# Patient Record
Sex: MALE | Race: WHITE | Employment: UNEMPLOYED | URBAN - METROPOLITAN AREA
[De-identification: names, ages, dates, MRNs, and addresses within clinical notes are randomized per-mention and may not be internally consistent; named-entity substitution may affect disease eponyms.]

---

## 2020-02-05 ENCOUNTER — OFFICE VISIT (OUTPATIENT)
Dept: PODIATRY CLINIC | Facility: CLINIC | Age: 30
End: 2020-02-05
Payer: MEDICAID

## 2020-02-05 DIAGNOSIS — L60.0 INGROWN TOENAIL OF BOTH FEET: Primary | ICD-10-CM

## 2020-02-05 PROCEDURE — 99202 OFFICE O/P NEW SF 15 MIN: CPT | Performed by: PODIATRIST

## 2020-02-05 RX ORDER — OXYCODONE AND ACETAMINOPHEN 10; 325 MG/1; MG/1
TABLET ORAL
COMMUNITY
Start: 2020-01-17 | End: 2020-02-05 | Stop reason: ALTCHOICE

## 2020-02-05 RX ORDER — ALPRAZOLAM 2 MG/1
TABLET ORAL
COMMUNITY
Start: 2020-01-17

## 2020-02-05 NOTE — PROGRESS NOTES
HPI:    Patient ID: Sarah Nathan is a 30year old male. This 38-year-old male presents as a new patient to me and states that he is self-referred. He is accompanied today by his wife. The primary concern is pain with both of his great toes.   The right is

## 2020-06-24 ENCOUNTER — OFFICE VISIT (OUTPATIENT)
Dept: PODIATRY CLINIC | Facility: CLINIC | Age: 30
End: 2020-06-24
Payer: MEDICAID

## 2020-06-24 VITALS — DIASTOLIC BLOOD PRESSURE: 56 MMHG | SYSTOLIC BLOOD PRESSURE: 103 MMHG | RESPIRATION RATE: 18 BRPM | HEART RATE: 56 BPM

## 2020-06-24 DIAGNOSIS — L60.0 INGROWN TOENAIL OF BOTH FEET: Primary | ICD-10-CM

## 2020-06-24 PROCEDURE — 11750 EXCISION NAIL&NAIL MATRIX: CPT | Performed by: PODIATRIST

## 2020-06-24 NOTE — PROGRESS NOTES
Per verbal order from [facility], draw up 1.5ml of 0.5% Marcaine and 1.5ml of 2% lidocaine x 2 for injection to bilateral first toe Darwyn Eagles Gal  Patient left office prior to obtaining post procedure vitals.

## 2020-07-08 ENCOUNTER — OFFICE VISIT (OUTPATIENT)
Dept: PODIATRY CLINIC | Facility: CLINIC | Age: 30
End: 2020-07-08
Payer: MEDICAID

## 2020-07-08 DIAGNOSIS — L60.0 INGROWN TOENAIL OF BOTH FEET: Primary | ICD-10-CM

## 2020-07-08 PROCEDURE — 99024 POSTOP FOLLOW-UP VISIT: CPT | Performed by: PODIATRIST

## 2020-07-08 RX ORDER — GABAPENTIN 300 MG/1
300 CAPSULE ORAL 3 TIMES DAILY
COMMUNITY
Start: 2020-04-13

## 2020-07-08 RX ORDER — OXYCODONE AND ACETAMINOPHEN 10; 325 MG/1; MG/1
1 TABLET ORAL 2 TIMES DAILY PRN
COMMUNITY
Start: 2020-06-10

## 2020-07-08 NOTE — PROGRESS NOTES
HPI:    Patient ID: Garland Arellano is a 30year old male. 73-year-old male presents approximately 2 weeks post ingrown toenail procedure of both great toes.   Patient states that he is doing well and has no apparent concerns and thinks that everything is fin

## 2020-09-17 ENCOUNTER — OFFICE VISIT (OUTPATIENT)
Dept: DERMATOLOGY CLINIC | Facility: CLINIC | Age: 30
End: 2020-09-17
Payer: MEDICAID

## 2020-09-17 DIAGNOSIS — L72.3 SEBACEOUS CYST: Primary | ICD-10-CM

## 2020-09-17 PROCEDURE — 99201 OFFICE/OUTPT VISIT,NEW,LEVL I: CPT | Performed by: DERMATOLOGY

## 2020-09-21 NOTE — PROGRESS NOTES
Shahzad Acevedo is a 30year old male.     Patient presents with:  Derm Problem: new patient, per patient for the past 2 years has had bumps on the back of right ear, states feels like a hard pimple, denies pain, no drainage, just irritating,denies personal or Non-medical: Not on file    Tobacco Use      Smoking status: Current Every Day Smoker        Packs/day: 3.00        Years: 12.00        Pack years: 39      Smokeless tobacco: Never Used      Tobacco comment: per patient smokes cigars    Substance and S family history of similar problems. No history of skin cancer no family history of skin cancer no other pigmented lesions of concern    Patient presents with concerns above. ROS:    Denies any other systemic complaints.   No fevers, chills, night swea indicates understanding of these issues and agrees to the plan. The patient is asked to return as noted in follow-up /as noted above    This note was generated using Dragon voice recognition software.   Please contact me regarding any confusion resulting f

## 2020-09-24 ENCOUNTER — OFFICE VISIT (OUTPATIENT)
Dept: OTOLARYNGOLOGY | Facility: CLINIC | Age: 30
End: 2020-09-24
Payer: MEDICAID

## 2020-09-24 VITALS
BODY MASS INDEX: 20.04 KG/M2 | HEIGHT: 70 IN | WEIGHT: 140 LBS | TEMPERATURE: 98 F | SYSTOLIC BLOOD PRESSURE: 100 MMHG | DIASTOLIC BLOOD PRESSURE: 60 MMHG

## 2020-09-24 DIAGNOSIS — H61.91 LESION OF RIGHT EXTERNAL EAR: Primary | ICD-10-CM

## 2020-09-24 PROCEDURE — 3008F BODY MASS INDEX DOCD: CPT | Performed by: OTOLARYNGOLOGY

## 2020-09-24 PROCEDURE — 3074F SYST BP LT 130 MM HG: CPT | Performed by: OTOLARYNGOLOGY

## 2020-09-24 PROCEDURE — 99203 OFFICE O/P NEW LOW 30 MIN: CPT | Performed by: OTOLARYNGOLOGY

## 2020-09-24 PROCEDURE — 3078F DIAST BP <80 MM HG: CPT | Performed by: OTOLARYNGOLOGY

## 2020-09-24 NOTE — PROGRESS NOTES
Rere Riggins is a 30year old male. Patient presents with:  Lump: Patient Presents with 2 lumps behind right ear for 5 yrs       HISTORY OF PRESENT ILLNESS    He presents with a history of 2 small cystic lesions behind his right ear for the past 5 years.   MATTY PHYSICAL EXAM    /60 (BP Location: Left arm, Patient Position: Sitting, Cuff Size: adult)   Temp 97.9 °F (36.6 °C) (Tympanic)   Ht 5' 10\" (1.778 m)   Wt 140 lb (63.5 kg)   BMI 20.09 kg/m²        Constitutional Normal Overall appearance - N understands the risks of surgery to include but not be limited to postoperative pain, bleeding as well as poor cosmesis. He accepts these risks and wishes to proceed        This note was prepared using [employer] voice recognition dictation software.

## 2020-09-25 ENCOUNTER — TELEPHONE (OUTPATIENT)
Dept: OTOLARYNGOLOGY | Facility: CLINIC | Age: 30
End: 2020-09-25

## 2020-10-05 ENCOUNTER — APPOINTMENT (OUTPATIENT)
Dept: LAB | Facility: HOSPITAL | Age: 30
End: 2020-10-05
Attending: OTOLARYNGOLOGY
Payer: MEDICAID

## 2020-10-05 DIAGNOSIS — Z01.818 PREOPERATIVE TESTING: ICD-10-CM

## 2020-10-08 ENCOUNTER — HOSPITAL ENCOUNTER (OUTPATIENT)
Facility: HOSPITAL | Age: 30
Setting detail: HOSPITAL OUTPATIENT SURGERY
Discharge: HOME | End: 2020-10-08
Attending: OTOLARYNGOLOGY | Admitting: OTOLARYNGOLOGY
Payer: MEDICAID

## 2020-10-08 VITALS
DIASTOLIC BLOOD PRESSURE: 58 MMHG | HEIGHT: 70 IN | RESPIRATION RATE: 16 BRPM | BODY MASS INDEX: 20 KG/M2 | SYSTOLIC BLOOD PRESSURE: 119 MMHG | TEMPERATURE: 98 F | OXYGEN SATURATION: 100 % | HEART RATE: 58 BPM

## 2020-10-08 DIAGNOSIS — H61.91 LESION OF RIGHT EXTERNAL EAR: ICD-10-CM

## 2020-10-08 DIAGNOSIS — Z01.818 PREOPERATIVE TESTING: Primary | ICD-10-CM

## 2020-10-08 PROBLEM — L72.3 SEBACEOUS CYST OF EAR: Status: ACTIVE | Noted: 2020-10-08

## 2020-10-08 PROBLEM — L72.3 SEBACEOUS CYST OF EAR: Status: RESOLVED | Noted: 2020-10-08 | Resolved: 2020-10-08

## 2020-10-08 PROCEDURE — 11440 EXC FACE-MM B9+MARG 0.5 CM/<: CPT | Performed by: OTOLARYNGOLOGY

## 2020-10-08 PROCEDURE — 0HB2XZZ EXCISION OF RIGHT EAR SKIN, EXTERNAL APPROACH: ICD-10-PCS | Performed by: OTOLARYNGOLOGY

## 2020-10-08 RX ORDER — HYDROCODONE BITARTRATE AND ACETAMINOPHEN 5; 325 MG/1; MG/1
1 TABLET ORAL EVERY 4 HOURS PRN
Status: CANCELLED | OUTPATIENT
Start: 2020-10-08

## 2020-10-08 RX ORDER — CEPHALEXIN 500 MG/1
500 CAPSULE ORAL EVERY 8 HOURS
Qty: 21 CAPSULE | Refills: 0 | Status: SHIPPED | OUTPATIENT
Start: 2020-10-08

## 2020-10-08 RX ORDER — LIDOCAINE HYDROCHLORIDE AND EPINEPHRINE 10; 10 MG/ML; UG/ML
INJECTION, SOLUTION INFILTRATION; PERINEURAL AS NEEDED
Status: DISCONTINUED | OUTPATIENT
Start: 2020-10-08 | End: 2020-10-08 | Stop reason: HOSPADM

## 2020-10-08 RX ORDER — ACETAMINOPHEN 160 MG/5ML
650 SOLUTION ORAL EVERY 4 HOURS PRN
Status: CANCELLED | OUTPATIENT
Start: 2020-10-08

## 2020-10-08 RX ORDER — ACETAMINOPHEN 325 MG/1
650 TABLET ORAL EVERY 4 HOURS PRN
Status: CANCELLED | OUTPATIENT
Start: 2020-10-08

## 2020-10-08 NOTE — INTERVAL H&P NOTE
Pre-op Diagnosis: Lesion of right external ear [H61.91]    The above referenced H&P was reviewed by Nikolas Whittington. Iván Green MD on 10/8/2020, the patient was examined and no significant changes have occurred in the patient's condition since the H&P was performed.

## 2020-10-08 NOTE — BRIEF OP NOTE
Pre-Operative Diagnosis: Lesion of right external ear [H61.91]     Post-Operative Diagnosis: Lesion of right external ear [H61.91]      Procedure Performed:   Procedure(s):  excision and closure of 2 right post auricular cysts    Surgeon(s) and Role:     *

## 2020-10-08 NOTE — H&P
HISTORY OF PRESENT ILLNESS     He presents with a history of 2 small cystic lesions behind his right ear for the past 5 years.   He states that at times they have spontaneously burst open and drained but now he has a few which are very bothersome to him and adult)   Temp 97.9 °F (36.6 °C) (Tympanic)   Ht 5' 10\" (1.778 m)   Wt 140 lb (63.5 kg)   BMI 20.09 kg/m²           Constitutional Normal Overall appearance - Normal.   Psychiatric Normal Orientation - Oriented to time, place, person & situation.  Appropria bleeding as well as poor cosmesis. He accepts these risks and wishes to proceed           This note was prepared using [employer] voice recognition dictation software. As a result errors may occur. When identified these errors have been corrected.  C/ Juan C Haile

## 2020-10-09 ENCOUNTER — TELEPHONE (OUTPATIENT)
Dept: OTOLARYNGOLOGY | Facility: CLINIC | Age: 30
End: 2020-10-09

## 2020-10-10 ENCOUNTER — TELEPHONE (OUTPATIENT)
Dept: OTOLARYNGOLOGY | Facility: CLINIC | Age: 30
End: 2020-10-10

## 2020-10-10 NOTE — TELEPHONE ENCOUNTER
Post op excision of right auricular cyst follow up ,pt stated he is doing fine no bleeding,no fever ,dressing dry and intact,reviewed post op medications,advised to call our office for any bleeding ,fever greater than 101 or for any concerns,pt verbalized

## 2020-10-16 ENCOUNTER — OFFICE VISIT (OUTPATIENT)
Dept: OTOLARYNGOLOGY | Facility: CLINIC | Age: 30
End: 2020-10-16
Payer: MEDICAID

## 2020-10-16 VITALS
HEIGHT: 70 IN | BODY MASS INDEX: 20.04 KG/M2 | SYSTOLIC BLOOD PRESSURE: 107 MMHG | WEIGHT: 140 LBS | TEMPERATURE: 98 F | DIASTOLIC BLOOD PRESSURE: 69 MMHG

## 2020-10-16 DIAGNOSIS — H61.91 LESION OF RIGHT EXTERNAL EAR: Primary | ICD-10-CM

## 2020-10-16 PROCEDURE — 3078F DIAST BP <80 MM HG: CPT | Performed by: OTOLARYNGOLOGY

## 2020-10-16 PROCEDURE — 99024 POSTOP FOLLOW-UP VISIT: CPT | Performed by: OTOLARYNGOLOGY

## 2020-10-16 PROCEDURE — 3074F SYST BP LT 130 MM HG: CPT | Performed by: OTOLARYNGOLOGY

## 2020-10-16 PROCEDURE — 3008F BODY MASS INDEX DOCD: CPT | Performed by: OTOLARYNGOLOGY

## 2020-10-16 NOTE — PROGRESS NOTES
Bassem Donnelly is a 30year old male. Patient presents with:  Post-Op: s/p excision of right auricular cyst done on 10/8/20      HISTORY OF PRESENT ILLNESS     He presents with a history of 2 small cystic lesions behind his right ear for the past 5 years. vision changes. Respiratory Negative Dyspnea and wheezing. Cardio Negative Chest pain, irregular heartbeat/palpitations and syncope. GI Negative Abdominal pain and diarrhea. Endocrine Negative Cold intolerance and heat intolerance.    Neuro Negative hours., Disp: 21 capsule, Rfl: 0  •  gabapentin 300 MG Oral Cap, Take 300 mg by mouth 3 (three) times daily. , Disp: , Rfl:   •  oxyCODONE-acetaminophen  MG Oral Tab, Take 1 tablet by mouth 2 (two) times daily as needed. , Disp: , Rfl:   •  alprazolam

## 2020-10-31 ENCOUNTER — HOSPITAL ENCOUNTER (EMERGENCY)
Facility: HOSPITAL | Age: 30
Discharge: HOME | End: 2020-10-31
Payer: MEDICAID

## 2020-10-31 ENCOUNTER — APPOINTMENT (OUTPATIENT)
Dept: CT IMAGING | Facility: HOSPITAL | Age: 30
End: 2020-10-31
Attending: NURSE PRACTITIONER
Payer: MEDICAID

## 2020-10-31 VITALS
TEMPERATURE: 99 F | SYSTOLIC BLOOD PRESSURE: 112 MMHG | DIASTOLIC BLOOD PRESSURE: 76 MMHG | RESPIRATION RATE: 18 BRPM | OXYGEN SATURATION: 98 % | HEART RATE: 55 BPM

## 2020-10-31 DIAGNOSIS — S01.01XA LACERATION OF SCALP, INITIAL ENCOUNTER: Primary | ICD-10-CM
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
## 2020-10-31 PROCEDURE — 70450 CT HEAD/BRAIN W/O DYE: CPT | Performed by: NURSE PRACTITIONER

## 2020-10-31 PROCEDURE — 12002 RPR S/N/AX/GEN/TRNK2.6-7.5CM: CPT

## 2020-10-31 PROCEDURE — 99284 EMERGENCY DEPT VISIT MOD MDM: CPT

## 2020-10-31 RX ORDER — ACETAMINOPHEN 500 MG
1000 TABLET ORAL ONCE
Status: COMPLETED | OUTPATIENT
Start: 2020-10-31 | End: 2020-10-31
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
## 2022-05-02 NOTE — PROGRESS NOTES
HPI:    Patient ID: Lindy Noriega is a 30year old male. This 26-year-old male presents for correction of ingrown toenail. After review of the procedure patient signed a written consent.     ROS:     I did review medical status medications were noted he ha yes

## 2023-04-06 ENCOUNTER — OFFICE VISIT (OUTPATIENT)
Dept: SURGERY | Facility: CLINIC | Age: 33
End: 2023-04-06
Payer: MEDICAID

## 2023-04-06 ENCOUNTER — TELEPHONE (OUTPATIENT)
Dept: SURGERY | Facility: CLINIC | Age: 33
End: 2023-04-06

## 2023-04-06 VITALS
HEIGHT: 70 IN | SYSTOLIC BLOOD PRESSURE: 118 MMHG | HEART RATE: 70 BPM | DIASTOLIC BLOOD PRESSURE: 70 MMHG | BODY MASS INDEX: 20.04 KG/M2 | WEIGHT: 140 LBS

## 2023-04-06 DIAGNOSIS — M79.18 MYOFASCIAL PAIN: ICD-10-CM

## 2023-04-06 DIAGNOSIS — M62.89 MUSCLE TIGHTNESS: ICD-10-CM

## 2023-04-06 DIAGNOSIS — M50.30 DDD (DEGENERATIVE DISC DISEASE), CERVICAL: ICD-10-CM

## 2023-04-06 DIAGNOSIS — M54.2 NECK PAIN: Primary | ICD-10-CM

## 2023-04-06 PROCEDURE — 99204 OFFICE O/P NEW MOD 45 MIN: CPT | Performed by: PHYSICIAN ASSISTANT

## 2023-04-06 PROCEDURE — 3078F DIAST BP <80 MM HG: CPT | Performed by: PHYSICIAN ASSISTANT

## 2023-04-06 PROCEDURE — 3074F SYST BP LT 130 MM HG: CPT | Performed by: PHYSICIAN ASSISTANT

## 2023-04-06 PROCEDURE — 3008F BODY MASS INDEX DOCD: CPT | Performed by: PHYSICIAN ASSISTANT

## 2023-04-06 NOTE — TELEPHONE ENCOUNTER
Pt brought in MRI Cervical Spine W/O Contrast from [facility] dated 7/9/22. The disc was uploaded to pacs and given back to pt. [facility] reviewed imaging.

## 2023-04-06 NOTE — PROGRESS NOTES
Pt here for neck pain. Pt states does not have numbness and tinlging. No neck surgery. No injections. no surgery. Pt had MRI cervical spine in July 2022.

## 2023-05-31 ENCOUNTER — HOSPITAL ENCOUNTER (OUTPATIENT)
Dept: GENERAL RADIOLOGY | Facility: HOSPITAL | Age: 33
Discharge: HOME | End: 2023-05-31
Attending: PHYSICIAN ASSISTANT
Payer: MEDICAID

## 2023-05-31 DIAGNOSIS — M54.2 NECK PAIN: ICD-10-CM

## 2023-05-31 DIAGNOSIS — M62.89 MUSCLE TIGHTNESS: ICD-10-CM

## 2023-05-31 DIAGNOSIS — M79.18 MYOFASCIAL PAIN: ICD-10-CM

## 2023-05-31 DIAGNOSIS — M50.30 DDD (DEGENERATIVE DISC DISEASE), CERVICAL: ICD-10-CM

## 2023-05-31 PROCEDURE — 72052 X-RAY EXAM NECK SPINE 6/>VWS: CPT | Performed by: PHYSICIAN ASSISTANT

## 2024-10-22 ENCOUNTER — APPOINTMENT (OUTPATIENT)
Dept: GENERAL RADIOLOGY | Facility: HOSPITAL | Age: 34
End: 2024-10-22
Attending: EMERGENCY MEDICINE
Payer: COMMERCIAL

## 2024-10-22 ENCOUNTER — APPOINTMENT (OUTPATIENT)
Dept: GENERAL RADIOLOGY | Facility: HOSPITAL | Age: 34
End: 2024-10-22
Payer: COMMERCIAL

## 2024-10-22 ENCOUNTER — HOSPITAL ENCOUNTER (EMERGENCY)
Facility: HOSPITAL | Age: 34
Discharge: HOME | End: 2024-10-22
Attending: EMERGENCY MEDICINE
Payer: COMMERCIAL

## 2024-10-22 VITALS
RESPIRATION RATE: 18 BRPM | HEART RATE: 86 BPM | WEIGHT: 150 LBS | HEIGHT: 70 IN | TEMPERATURE: 99 F | BODY MASS INDEX: 21.47 KG/M2 | SYSTOLIC BLOOD PRESSURE: 113 MMHG | OXYGEN SATURATION: 98 % | DIASTOLIC BLOOD PRESSURE: 66 MMHG
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
## 2024-10-22 DIAGNOSIS — S39.012A STRAIN OF LUMBAR REGION, INITIAL ENCOUNTER: ICD-10-CM

## 2024-10-22 DIAGNOSIS — S40.012A CONTUSION OF LEFT SHOULDER, INITIAL ENCOUNTER: ICD-10-CM

## 2024-10-22 PROCEDURE — 99283 EMERGENCY DEPT VISIT LOW MDM: CPT

## 2024-10-22 PROCEDURE — 72110 X-RAY EXAM L-2 SPINE 4/>VWS: CPT | Performed by: EMERGENCY MEDICINE

## 2024-10-22 PROCEDURE — 99284 EMERGENCY DEPT VISIT MOD MDM: CPT

## 2024-10-22 PROCEDURE — 73030 X-RAY EXAM OF SHOULDER: CPT | Performed by: EMERGENCY MEDICINE

## 2024-10-22 RX ORDER — HYDROCODONE BITARTRATE AND ACETAMINOPHEN 5; 325 MG/1; MG/1
1 TABLET ORAL ONCE
Status: COMPLETED | OUTPATIENT
Start: 2024-10-22 | End: 2024-10-22

## 2024-10-22 RX ORDER — HYDROCODONE BITARTRATE AND ACETAMINOPHEN 5; 325 MG/1; MG/1
1-2 TABLET ORAL EVERY 6 HOURS PRN
Qty: 10 TABLET | Refills: 0 | Status: SHIPPED | OUTPATIENT
Start: 2024-10-22 | End: 2024-10-27

## 2024-10-22 RX ORDER — DIAZEPAM 5 MG/1
5 TABLET ORAL ONCE
Status: COMPLETED | OUTPATIENT
Start: 2024-10-22 | End: 2024-10-22

## 2024-10-22 RX ORDER — CYCLOBENZAPRINE HCL 10 MG
10 TABLET ORAL 3 TIMES DAILY PRN
Qty: 20 TABLET | Refills: 0 | Status: SHIPPED | OUTPATIENT
Start: 2024-10-22 | End: 2024-10-29
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
## 2024-10-22 NOTE — ED QUICK NOTES
Upon entering room with medication, patient seen with nicotine vape in hand. [facility] no smoking/vaping policy in hospital policy reviewed. Patient verbalized understanding.
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
## 2024-11-13 ENCOUNTER — TELEPHONE (OUTPATIENT)
Dept: ORTHOPEDICS CLINIC | Facility: CLINIC | Age: 34
End: 2024-11-13

## 2024-11-13 NOTE — TELEPHONE ENCOUNTER
Patient scheduled on [facility] with Dr Paola garcia rlower back pain. Has imaging from October. Please advise if new imaging is needed  Future Appointments   Date Time Provider Department Center   12/5/2024  2:40 PM Martin Guevara MD [facility]

## 2024-12-05 ENCOUNTER — OFFICE VISIT (OUTPATIENT)
Dept: ORTHOPEDICS CLINIC | Facility: CLINIC | Age: 34
End: 2024-12-05
Payer: MEDICAID

## 2024-12-05 VITALS — BODY MASS INDEX: 17.9 KG/M2 | WEIGHT: 125 LBS | HEIGHT: 70 IN

## 2024-12-05 DIAGNOSIS — M51.379 DEGENERATIVE DISC DISEASE AT L5-S1 LEVEL: Primary | ICD-10-CM

## 2024-12-05 PROCEDURE — 99204 OFFICE O/P NEW MOD 45 MIN: CPT | Performed by: STUDENT IN AN ORGANIZED HEALTH CARE EDUCATION/TRAINING PROGRAM

## 2024-12-05 RX ORDER — OXYCODONE HYDROCHLORIDE 10 MG/1
TABLET ORAL
COMMUNITY
Start: 2024-11-14

## 2024-12-05 RX ORDER — CLONAZEPAM 1 MG/1
TABLET ORAL
COMMUNITY
Start: 2024-11-14
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
## 2025-04-04 ENCOUNTER — TELEPHONE (OUTPATIENT)
Dept: OTOLARYNGOLOGY | Facility: CLINIC | Age: 35
End: 2025-04-04

## 2025-04-04 ENCOUNTER — OFFICE VISIT (OUTPATIENT)
Dept: OTOLARYNGOLOGY | Facility: CLINIC | Age: 35
End: 2025-04-04
Payer: MEDICAID

## 2025-04-04 DIAGNOSIS — L72.0 EPIDERMAL CYST OF NECK: Primary | ICD-10-CM

## 2025-04-04 PROCEDURE — 99203 OFFICE O/P NEW LOW 30 MIN: CPT | Performed by: OTOLARYNGOLOGY

## 2025-04-04 RX ORDER — SULFAMETHOXAZOLE AND TRIMETHOPRIM 800; 160 MG/1; MG/1
1 TABLET ORAL EVERY 12 HOURS
Qty: 14 TABLET | Refills: 0 | Status: SHIPPED | OUTPATIENT
Start: 2025-04-04

## 2025-04-04 RX ORDER — METHYLPREDNISOLONE 4 MG/1
TABLET ORAL
Qty: 21 TABLET | Refills: 0 | Status: SHIPPED | OUTPATIENT
Start: 2025-04-04

## 2025-04-04 NOTE — PROGRESS NOTES
Phil Penny IV is a 34 year old male.    Chief Complaint   Patient presents with    Mass     Cyst on back of neck x1 year  Pt report discomfort and redness       HISTORY OF PRESENT ILLNESS  He presents with a history of 2 small cystic lesions behind his right ear for the past 5 years.  He states that at times they have spontaneously burst open and drained but now he has a few which are very bothersome to him and wishes to have them addressed.  No other signs, symptoms or complaints at this time.     10/16/2020 doing very well 8 days out from excision of 2 cysts of the right posterior ear doing well no complaints or concerns.  Here to have sutures removed.    4/4/25 he has history of excision of 2 cysts behind his ear on the right.  No problems until more recently when he had a tiny pea-sized structure which is enlarged to its current size of slightly smaller than a quarter but greater than a nickel on his neck posteriorly on the left.  Feels more pain and discomfort when he touches it.  No other signs, symptoms or complaint    Social History     Socioeconomic History    Marital status: Single   Tobacco Use    Smoking status: Former    Smokeless tobacco: Former    Tobacco comments:     per patient he vapes   Vaping Use    Vaping status: Never Used   Substance and Sexual Activity    Alcohol use: Never    Drug use: Not Currently     Types: Cannabis   Other Topics Concern    Reaction to local anesthetic No       Family History   Problem Relation Age of Onset    Hypertension Mother     Cancer Maternal Grandfather     Cancer Paternal Grandfather        Past Medical History:    Anxiety    Anxiety state    Arthritis    Attention deficit hyperactivity disorder (ADHD)    Depression       Past Surgical History:   Procedure Laterality Date    Exc skin benig 2.1-3cm face,facial  10/08/2020    Repr cmpl wnd head,fac,hand 2.6-7.5  10/08/2020    Skin surgery           REVIEW OF SYSTEMS    System Neg/Pos Details   Constitutional  Negative Fatigue, fever and weight loss.   ENMT Negative Drooling.   Eyes Negative Blurred vision and vision changes.   Respiratory Negative Dyspnea and wheezing.   Cardio Negative Chest pain, irregular heartbeat/palpitations and syncope.   GI Negative Abdominal pain and diarrhea.   Endocrine Negative Cold intolerance and heat intolerance.   Neuro Negative Tremors.   Psych Negative Anxiety and depression.   Integumentary Negative Frequent skin infections, pigment change and rash.   Hema/Lymph Negative Easy bleeding and easy bruising.           PHYSICAL EXAM    There were no vitals taken for this visit.       Constitutional Normal Overall appearance - Normal.   Psychiatric Normal Orientation - Oriented to time, place, person & situation. Appropriate mood and affect.   Neck Exam Normal Inspection - Normal. Palpation - Normal. Parotid gland - Normal. Thyroid gland - Normal.   Eyes Normal Conjunctiva - Right: Normal, Left: Normal. Pupil - Right: Normal, Left: Normal. Fundus - Right: Normal, Left: Normal.   Neurological Normal Memory - Normal. Cranial nerves - Cranial nerves II through XII grossly intact.   Head/Face Normal Facial features - Normal. Eyebrows - Normal. Skull - Normal.        Nasopharynx Normal External nose - Normal. Lips/teeth/gums - Normal. Tonsils - Normal. Oropharynx - Normal.   Ears Normal Inspection - Right: Normal, Left: Normal. Canal - Right: Normal, Left: Normal. TM - Right: Normal, Left: Normal.   Skin Normal Inspection -1.75 cm erythematous cystic lesion of the posterior neck on the left        Lymph Detail Normal Submental. Submandibular. Anterior cervical. Posterior cervical. Supraclavicular.        Nose/Mouth/Throat Normal External nose - Normal. Lips/teeth/gums - Normal. Tonsils - Normal. Oropharynx - Normal.   Nose/Mouth/Throat Normal Nares - Right: Normal Left: Normal. Septum -Normal  Turbinates - Right: Normal, Left: Normal.       Current Outpatient Medications:      sulfamethoxazole-trimethoprim -160 MG Oral Tab per tablet, Take 1 tablet by mouth every 12 (twelve) hours., Disp: 14 tablet, Rfl: 0    methylPREDNISolone 4 MG Oral Tablet Therapy Pack, Take by oral route., Disp: 21 tablet, Rfl: 0    clonazePAM 1 MG Oral Tab, 1 tablet Orally twice a day for 3 days, Disp: , Rfl:     oxyCODONE 10 MG Oral Tab, TAKE 1 TABLET BY MOUTH DAILY. CONTINUE TO WEAN DOWN, Disp: , Rfl:     gabapentin 300 MG Oral Cap, Take 300 mg by mouth 3 (three) times daily., Disp: , Rfl:     oxyCODONE-acetaminophen  MG Oral Tab, Take 1 tablet by mouth 2 (two) times daily as needed., Disp: , Rfl:     alprazolam 2 MG Oral Tab, TK 1 T PO BID, Disp: , Rfl:   ASSESSMENT AND PLAN    1. Epidermal cyst of neck  Slightly infected cyst of the left posterior neck.  Start Bactrim Medrol Dosepak and I did recommend excising this under local anesthesia in the near future.  Risks and benefits were discussed and understood.  He wishes to proceed        This note was prepared using Dragon Medical voice recognition dictation software. As a result errors may occur. When identified these errors have been corrected. While every attempt is made to correct errors during dictation discrepancies may still exist    Phil Dailey MD    4/4/2025    2:35 PM

## 2025-04-04 NOTE — TELEPHONE ENCOUNTER
Patient scheduled for Excision and closure left neck cyst on 4/9/25 at [facility].

## 2025-04-04 NOTE — PROGRESS NOTES
Patient scheduled for Excision and closure left neck cyst on 4/9/25 at [facility].

## 2025-04-07 NOTE — DISCHARGE INSTRUCTIONS
Call for follow up appointment in approx 1 week.   Keep your dressing clean dry and intact until your follow up.  You may shower, but you cannot get your dressing wet.   If you have any questions or concerns call your MD  Call MD if you are showing any signs or symptoms of infection, such as: fever, chills, body aches, abnormal drainage like pus or green and foul odor.   You make take tylenol if needed for pain.

## 2025-04-09 ENCOUNTER — HOSPITAL ENCOUNTER (OUTPATIENT)
Facility: HOSPITAL | Age: 35
Setting detail: HOSPITAL OUTPATIENT SURGERY
Discharge: HOME | End: 2025-04-09
Attending: OTOLARYNGOLOGY | Admitting: OTOLARYNGOLOGY
Payer: MEDICAID

## 2025-04-09 VITALS
BODY MASS INDEX: 17.9 KG/M2 | RESPIRATION RATE: 16 BRPM | WEIGHT: 125 LBS | SYSTOLIC BLOOD PRESSURE: 102 MMHG | TEMPERATURE: 98 F | HEIGHT: 70 IN | DIASTOLIC BLOOD PRESSURE: 59 MMHG | OXYGEN SATURATION: 97 % | HEART RATE: 56 BPM

## 2025-04-09 DIAGNOSIS — L72.0 EPIDERMAL CYST OF NECK: ICD-10-CM

## 2025-04-09 PROCEDURE — 0JB50ZZ EXCISION OF LEFT NECK SUBCUTANEOUS TISSUE AND FASCIA, OPEN APPROACH: ICD-10-PCS | Performed by: OTOLARYNGOLOGY

## 2025-04-09 PROCEDURE — 11422 EXC H-F-NK-SP B9+MARG 1.1-2: CPT | Performed by: OTOLARYNGOLOGY

## 2025-04-09 PROCEDURE — 12042 INTMD RPR N-HF/GENIT2.6-7.5: CPT | Performed by: OTOLARYNGOLOGY

## 2025-04-09 RX ORDER — HYDROMORPHONE HYDROCHLORIDE 1 MG/ML
0.4 INJECTION, SOLUTION INTRAMUSCULAR; INTRAVENOUS; SUBCUTANEOUS EVERY 2 HOUR PRN
Refills: 0 | OUTPATIENT
Start: 2025-04-09

## 2025-04-09 RX ORDER — ONDANSETRON 4 MG/1
4 TABLET, FILM COATED ORAL EVERY 6 HOURS PRN
OUTPATIENT
Start: 2025-04-09

## 2025-04-09 RX ORDER — OXYCODONE HYDROCHLORIDE 5 MG/1
10 TABLET ORAL EVERY 4 HOURS PRN
Refills: 0 | OUTPATIENT
Start: 2025-04-09

## 2025-04-09 RX ORDER — ACETAMINOPHEN 325 MG/1
650 TABLET ORAL
OUTPATIENT
Start: 2025-04-09

## 2025-04-09 RX ORDER — LIDOCAINE HYDROCHLORIDE AND EPINEPHRINE 10; 10 MG/ML; UG/ML
INJECTION, SOLUTION INFILTRATION; PERINEURAL AS NEEDED
Status: DISCONTINUED | OUTPATIENT
Start: 2025-04-09 | End: 2025-04-09 | Stop reason: HOSPADM

## 2025-04-09 RX ORDER — HYDROMORPHONE HYDROCHLORIDE 1 MG/ML
0.8 INJECTION, SOLUTION INTRAMUSCULAR; INTRAVENOUS; SUBCUTANEOUS EVERY 2 HOUR PRN
Refills: 0 | OUTPATIENT
Start: 2025-04-09

## 2025-04-09 RX ORDER — OXYCODONE HYDROCHLORIDE 5 MG/1
5 TABLET ORAL EVERY 4 HOURS PRN
Refills: 0 | OUTPATIENT
Start: 2025-04-09

## 2025-04-09 RX ORDER — ONDANSETRON 2 MG/ML
4 INJECTION INTRAMUSCULAR; INTRAVENOUS EVERY 6 HOURS PRN
OUTPATIENT
Start: 2025-04-09

## 2025-04-09 RX ORDER — ONDANSETRON 4 MG/1
4 TABLET, ORALLY DISINTEGRATING ORAL EVERY 6 HOURS PRN
OUTPATIENT
Start: 2025-04-09

## 2025-04-09 NOTE — INTERVAL H&P NOTE
Pre-op Diagnosis: Epidermal cyst of neck [L72.0]    The above referenced H&P was reviewed by Phil Dailey MD on 4/9/2025, the patient was examined and no significant changes have occurred in the patient's condition since the H&P was performed.  I discussed with the patient and/or legal representative the potential benefits, risks and side effects of this procedure; the likelihood of the patient achieving goals; and potential problems that might occur during recuperation.  I discussed reasonable alternatives to the procedure, including risks, benefits and side effects related to the alternatives and risks related to not receiving this procedure.  We will proceed with procedure as planned.

## 2025-04-09 NOTE — OPERATIVE REPORT
Preoperative diagnosis: Left posterior neck cyst 1.25 cm    Postoperative diagnosis: Same    Procedure: #1 excision of left posterior neck cyst measuring 1.25 cm with #2 intermediate closure of 2.75 x 1.5 cm defect    Surgeon: Phil Dailey MD    Date of service:4/9/2025    Anesthesia: 1% Xylocaine with 1-100,000 epinephrine    EBL: Less than 1 mL    Indications: Patient presents with a history of enlarging cyst that recently has been infected and requires excision    Procedure: Patient was taken to the operating room placed in the supine position and following the infiltration of the left posterior neck cyst the patient was prepped and draped in regular fashion.  A 15 blade was used to excise the lesion in a fusiform fashion creating a defect measuring 1.5 x 2.75 cm.  The lesion was sent in formalin to pathology.  Any bleeding sites were controlled using bipolar cautery.  An intermediate closure was performed measuring 2.75  cm by approximation of the deep subcutaneous tissues which were closed with 4-0 PDS suture followed by approximation eversion and closure of the skin layer using a running locked 5-0 fast-absorbing gut suture.  The wound was cleaned and a tape and benzoin dressing was placed.  The patient was then taken to recovery room without any complications.  EBL less than 1 mL.

## 2025-04-10 ENCOUNTER — TELEPHONE (OUTPATIENT)
Dept: OTOLARYNGOLOGY | Facility: CLINIC | Age: 35
End: 2025-04-10

## 2025-04-10 NOTE — TELEPHONE ENCOUNTER
Post op day 1 - Excision and closure left neck cyst      Attempted to faraz patient, left him a voicemail and sent him a [employer] message.     Keep your dressing clean dry and intact until your follow up.     You may shower, but you cannot get your dressing wet.     Call MD if you are showing any signs or symptoms of infection, such as: fever, chills, body aches, abnormal drainage like pus or green and foul odor.     You make take tylenol if needed for pain.    Follow up in 8 days - 4/17/25    If you have any questions or concerns call your MD

## 2025-04-10 NOTE — TELEPHONE ENCOUNTER
Per pt c/o of pain in neck over incision when taking a breath.Also pt asking if another antibiotic should be prescribed or should pt finish bactrim. Pt advised that per , since pt is on oxycodone there is no other pain medication that would be prescribed and pt to just finish the Bactrim prescribed on 04/04.

## 2025-04-18 ENCOUNTER — OFFICE VISIT (OUTPATIENT)
Dept: OTOLARYNGOLOGY | Facility: CLINIC | Age: 35
End: 2025-04-18
Payer: MEDICAID

## 2025-04-18 DIAGNOSIS — L72.0 EPIDERMAL CYST OF NECK: Primary | ICD-10-CM

## 2025-04-18 PROCEDURE — 99024 POSTOP FOLLOW-UP VISIT: CPT | Performed by: OTOLARYNGOLOGY

## 2025-04-18 NOTE — PROGRESS NOTES
Phil Penny IV is a 34 year old male.    Chief Complaint   Patient presents with    Post-Op     Patient is here due to Excision and closure left neck cyst Post op        HISTORY OF PRESENT ILLNESS  He presents with a history of 2 small cystic lesions behind his right ear for the past 5 years.  He states that at times they have spontaneously burst open and drained but now he has a few which are very bothersome to him and wishes to have them addressed.  No other signs, symptoms or complaints at this time.     10/16/2020 doing very well 8 days out from excision of 2 cysts of the right posterior ear doing well no complaints or concerns.  Here to have sutures removed.     4/4/25 he has history of excision of 2 cysts behind his ear on the right.  No problems until more recently when he had a tiny pea-sized structure which is enlarged to its current size of slightly smaller than a quarter but greater than a nickel on his neck posteriorly on the left.  Feels more pain and discomfort when he touches it.  No other signs, symptoms or complaint     4/18/25 9 days out from excision of a large cyst of his posterior neck on the left.  Doing very well no complaints or concerns.  Here to have sutures removed.      Social Hx on file[1]    Family History[2]    Past Medical History[3]    Past Surgical History[4]      REVIEW OF SYSTEMS    System Neg/Pos Details   Constitutional Negative Fatigue, fever and weight loss.   ENMT Negative Drooling.   Eyes Negative Blurred vision and vision changes.   Respiratory Negative Dyspnea and wheezing.   Cardio Negative Chest pain, irregular heartbeat/palpitations and syncope.   GI Negative Abdominal pain and diarrhea.   Endocrine Negative Cold intolerance and heat intolerance.   Neuro Negative Tremors.   Psych Negative Anxiety and depression.   Integumentary Negative Frequent skin infections, pigment change and rash.   Hema/Lymph Negative Easy bleeding and easy bruising.           PHYSICAL  EXAM    There were no vitals taken for this visit.       Constitutional Normal Overall appearance - Normal.   Psychiatric Normal Orientation - Oriented to time, place, person & situation. Appropriate mood and affect.   Neck Exam Normal Inspection - Normal. Palpation - Normal. Parotid gland - Normal. Thyroid gland - Normal.   Eyes Normal Conjunctiva - Right: Normal, Left: Normal. Pupil - Right: Normal, Left: Normal. Fundus - Right: Normal, Left: Normal.   Neurological Normal Memory - Normal. Cranial nerves - Cranial nerves II through XII grossly intact.   Head/Face Normal Facial features - Normal. Eyebrows - Normal. Skull - Normal.        Nasopharynx Normal External nose - Normal. Lips/teeth/gums - Normal. Tonsils - Normal. Oropharynx - Normal.   Ears Normal Inspection - Right: Normal, Left: Normal. Canal - Right: Normal, Left: Normal. TM - Right: Normal, Left: Normal.   Skin Normal Inspection - Normal.        Lymph Detail Normal Submental. Submandibular. Anterior cervical. Posterior cervical. Supraclavicular.   Incision  Clean dry and intact.   Nose/Mouth/Throat Normal External nose - Normal. Lips/teeth/gums - Normal. Tonsils - Normal. Oropharynx - Normal.   Nose/Mouth/Throat Normal Nares - Right: Normal Left: Normal. Septum -Normal  Turbinates - Right: Normal, Left: Normal.     Medications - Current[5]  ASSESSMENT AND PLAN    1. Epidermal cyst of neck  Healing very nicely all sutures removed Path reviewed with the patient and spouse wish demonstrates an epidermal inclusion cyst with spontaneous rupture.  Wound care discussed and understood.  Return to see me as needed        This note was prepared using Dragon Medical voice recognition dictation software. As a result errors may occur. When identified these errors have been corrected. While every attempt is made to correct errors during dictation discrepancies may still exist    Phil Dailey MD    4/18/2025    5:53 PM         [1]   Social History  Socioeconomic  History    Marital status: Single   Tobacco Use    Smoking status: Former    Smokeless tobacco: Former   Vaping Use    Vaping status: Former    Substances: THC   Substance and Sexual Activity    Alcohol use: Never    Drug use: Not Currently     Types: Cannabis   Other Topics Concern    Reaction to local anesthetic No   [2]   Family History  Problem Relation Age of Onset    Hypertension Mother     Cancer Maternal Grandfather     Cancer Paternal Grandfather    [3]   Past Medical History:   Anxiety    Anxiety state    Arthritis    Attention deficit hyperactivity disorder (ADHD)    Depression   [4]   Past Surgical History:  Procedure Laterality Date    Exc skin benig 2.1-3cm face,facial  10/08/2020    Repr cmpl wnd head,fac,hand 2.6-7.5  10/08/2020    Skin surgery  04/09/2025    Excision and closure left neck cyst   [5]   Current Outpatient Medications:     sulfamethoxazole-trimethoprim -160 MG Oral Tab per tablet, Take 1 tablet by mouth every 12 (twelve) hours., Disp: 14 tablet, Rfl: 0    methylPREDNISolone 4 MG Oral Tablet Therapy Pack, Take by oral route., Disp: 21 tablet, Rfl: 0    clonazePAM 1 MG Oral Tab, Take 2 tablets (2 mg total) by mouth as needed in the morning and 2 tablets (2 mg total) as needed in the evening for Anxiety., Disp: , Rfl:     oxyCODONE 10 MG Oral Tab, , Disp: , Rfl:     oxyCODONE-acetaminophen  MG Oral Tab, Take 1 tablet by mouth as needed in the morning and 1 tablet as needed in the evening., Disp: , Rfl:

## 2025-05-20 ENCOUNTER — HOSPITAL ENCOUNTER (OUTPATIENT)
Age: 35
Discharge: HOME | End: 2025-05-20
Payer: MEDICAID

## 2025-05-20 ENCOUNTER — APPOINTMENT (OUTPATIENT)
Dept: GENERAL RADIOLOGY | Age: 35
End: 2025-05-20
Payer: MEDICAID

## 2025-05-20 VITALS
HEART RATE: 72 BPM | RESPIRATION RATE: 18 BRPM | DIASTOLIC BLOOD PRESSURE: 69 MMHG | SYSTOLIC BLOOD PRESSURE: 124 MMHG | TEMPERATURE: 98 F | OXYGEN SATURATION: 95 %

## 2025-05-20 DIAGNOSIS — J06.9 VIRAL UPPER RESPIRATORY TRACT INFECTION: Primary | ICD-10-CM

## 2025-05-20 DIAGNOSIS — H61.23 BILATERAL IMPACTED CERUMEN: ICD-10-CM

## 2025-05-20 DIAGNOSIS — R05.2 SUBACUTE COUGH: ICD-10-CM

## 2025-05-20 PROCEDURE — 71046 X-RAY EXAM CHEST 2 VIEWS: CPT

## 2025-05-20 PROCEDURE — 99203 OFFICE O/P NEW LOW 30 MIN: CPT

## 2025-05-20 RX ORDER — BENZONATATE 100 MG/1
CAPSULE ORAL 3 TIMES DAILY PRN
Qty: 30 CAPSULE | Refills: 0 | Status: SHIPPED | OUTPATIENT
Start: 2025-05-20 | End: 2025-06-19

## 2025-05-20 RX ORDER — OFLOXACIN 3 MG/ML
5 SOLUTION AURICULAR (OTIC) 2 TIMES DAILY
Qty: 5 ML | Refills: 0 | Status: SHIPPED | OUTPATIENT
Start: 2025-05-20 | End: 2025-05-27

## 2025-05-20 NOTE — ED INITIAL ASSESSMENT (HPI)
Pt reports starting  augmentin on 5/14 for strep and still currently taking them. Chest congestion, hoarse voice, headache, shortness of breath, nasal congestion, productive yellow sputum.  At home covid and flu tests were negative 5/16.

## 2025-05-20 NOTE — DISCHARGE INSTRUCTIONS
There are no signs of infection on physical exam.  Your chest x-ray was normal.  This is likely a viral illness.  I sent a prescription for Tessalon Perles for the cough.  Please be sure to drink plenty of fluids, use Tylenol and Motrin for pain or fever.  Use Flonase and Mucinex for congestion.  If you develop any respiratory complaints, fever that does not improve with medications or any other concerning complaints you should go to the emergency department. Otherwise follow up with your primary care provider.       If you get persistent earwax buildup you can use Debrox over-the-counter earwax softening drops weekly to prevent buildup.    You can also gently flush the ear with a showerhead or a bulb syringe.    Do not use Q-tips.  If you develop any ear pain, drainage, redness surrounding the ear, fever or any other concerning complaints they should go to the ED.    Otherwise follow up with your primary care provider.

## 2025-05-20 NOTE — ED PROVIDER NOTES
Patient Seen in: [facility]      History     Chief Complaint   Patient presents with    Cough     Stated Complaint: SOB, Congestion  Subjective:   Phil is a 34-year-old male presenting to the immediate care complaining of congestion, cough, rhinorrhea, fatigue, shortness of breath with cough, postnasal drip and sore throat for the past 4 days.  Patient states that he was seen on 5/13 and tested positive for strep.  Started Augmentin on 5/14 to treat the strep; patient still has 1 day of antibiotics left.  Denies any episodes of respiratory distress or difficulty breathing.  States that his throat feels better; no longer has any painful throat.  He does have a mild hoarse voice.  No difficulty swallowing.  Patient states that he feels short of breath with the cough.  Denies any chest pain, abdominal pain or vomiting.  Has a decreased appetite.  He is tolerating p.o. fluids well and is well-hydrated.  He denies any other concerns or complaints.        Objective:   Past Medical History:    Anxiety    Anxiety state    Arthritis    Attention deficit hyperactivity disorder (ADHD)    Depression            Past Surgical History:   Procedure Laterality Date    Exc skin benig 2.1-3cm face,facial  10/08/2020    Repr cmpl wnd head,fac,hand 2.6-7.5  10/08/2020    Skin surgery  04/09/2025    Excision and closure left neck cyst              Social History     Socioeconomic History    Marital status: Single   Tobacco Use    Smoking status: Former    Smokeless tobacco: Former   Vaping Use    Vaping status: Some Days    Substances: THC   Substance and Sexual Activity    Alcohol use: Never    Drug use: Not Currently     Types: Cannabis   Other Topics Concern    Reaction to local anesthetic No            Review of Systems    Positive for stated complaint: Cough    Other systems are as noted in HPI.  Constitutional and vital signs reviewed.      All other systems reviewed and negative except as noted above.    Physical  Exam     ED Triage Vitals [05/20/25 1307]   /69   Pulse 72   Resp 18   Temp 97.8 °F (36.6 °C)   Temp src Oral   SpO2 95 %   O2 Device None (Room air)     Current:/69   Pulse 72   Temp 97.8 °F (36.6 °C) (Oral)   Resp 18   SpO2 95%     Physical Exam  Vitals and nursing note reviewed.   Constitutional:       General: He is not in acute distress.     Appearance: Normal appearance. He is not ill-appearing, toxic-appearing or diaphoretic.   HENT:      Head: Normocephalic.      Right Ear: Ear canal and external ear normal. No tenderness. A middle ear effusion is present. There is impacted cerumen. No mastoid tenderness.      Left Ear: Ear canal and external ear normal. No tenderness. A middle ear effusion is present. There is impacted cerumen. No mastoid tenderness.      Nose: Congestion and rhinorrhea present.      Mouth/Throat:      Mouth: Mucous membranes are moist.      Pharynx: Oropharynx is clear. Uvula midline. No pharyngeal swelling, oropharyngeal exudate, posterior oropharyngeal erythema, uvula swelling or postnasal drip.      Tonsils: No tonsillar exudate or tonsillar abscesses. 0 on the right. 0 on the left.      Comments: No trismus  Eyes:      Conjunctiva/sclera: Conjunctivae normal.   Cardiovascular:      Rate and Rhythm: Normal rate and regular rhythm.      Pulses: Normal pulses.      Heart sounds: Normal heart sounds.   Pulmonary:      Effort: Pulmonary effort is normal. No tachypnea, bradypnea, accessory muscle usage, prolonged expiration, respiratory distress or retractions.      Breath sounds: Normal breath sounds and air entry. No stridor, decreased air movement or transmitted upper airway sounds. No decreased breath sounds, wheezing, rhonchi or rales.   Abdominal:      General: Abdomen is flat.   Musculoskeletal:         General: Normal range of motion.      Cervical back: Normal range of motion.   Skin:     General: Skin is warm.      Capillary Refill: Capillary refill takes less than  2 seconds.   Neurological:      General: No focal deficit present.      Mental Status: He is alert and oriented to person, place, and time.   Psychiatric:         Mood and Affect: Mood normal.         Behavior: Behavior normal.         Thought Content: Thought content normal.         Judgment: Judgment normal.         ED Course     Radiology:    XR CHEST PA + LAT CHEST (CPT=71046)   Final Result   PROCEDURE: XR CHEST PA + LAT CHEST (CPT=71046)       COMPARISON: None.       INDICATIONS: Acute SOB & chest pressure.  No cough.       Findings and impression:       Normal heart size       Hyperinflated lungs       Patchy pulmonary opacities or atelectasis throughout the posterior right    lower lobe       No pneumothorax or pleural effusion   Finalized by (CST): Rodrigo Frias MD on 5/20/2025 at 2:23 PM                          Labs Reviewed - No data to display    MDM     Medical Decision Making  Differential diagnoses reflecting the complexity of care include but are not limited to viral versus bacterial etiology of upper respiratory complaints; cerumen impaction, otitis media, otitis externa.    Comorbidities that add complexity to management include: Currently being treated for strep  History obtained by an independent source was from: Patient  My independent interpretations of studies include: X-ray  Patient is well appearing, non-toxic and in no acute distress.  Vital signs are stable.     Patient took COVID and influenza test at home that were both negative.    Patient is currently on Augmentin for a positive strep test and has 2 days of medication left.   There are no other signs of infection on physical exam.  Chest x-ray was negative for pneumonia.    History and physical exam are consistent with viral illness.    Sent a prescription for Tessalon Perles for the cough.  Recommended that patient drink plenty of fluids, use Tylenol and Motrin for pain or fever, use Flonase for nasal congestion and may use  over-the-counter medications for congestion as needed.  Recommended that if the patient develops any chest pain, respiratory complaints, fever that does not improve with medications or any other concerning complaints they should go to the emergency department.      History and physical exam are also consistent with bilateral cerumen impaction.  Cerumen impaction irrigated and curette used to remove cerumen with good results by this provider.  Bilateral TMs with turbid fluid, canals clear; there is erythema and irritation following irrigation to both canals.  Sent a prescription for ofloxacin drops.  No mastoid tenderness.  No tragus tenderness.  Recommended that if the patient develop any ear pain, drainage, redness surrounding the ear, fever or any other concerning complaints they should go to the ED.  Recommended the patient make an appointment to follow up with your primary care provider.      ED precautions discussed.  Patient (guardian) advised to follow up with PCP in 2-3 days.  Patient (guardian) agrees with this plan of care.  Patient (guardian) verbalizes understanding of discharge instructions and plan of care.      Amount and/or Complexity of Data Reviewed  Radiology: ordered and independent interpretation performed. Decision-making details documented in ED Course.    Risk  OTC drugs.  Prescription drug management.        Disposition and Plan     Clinical Impression:  1. Viral upper respiratory tract infection    2. Subacute cough    3. Bilateral impacted cerumen         Disposition:  Discharge  5/20/2025  2:28 pm    Follow-up:  Frederic Jimenez MD  [address] [place] [id number]  [phone]                Medications Prescribed:  Discharge Medication List as of 5/20/2025  2:26 PM

## 2025-06-20 ENCOUNTER — TELEPHONE (OUTPATIENT)
Dept: ORTHOPEDICS CLINIC | Facility: CLINIC | Age: 35
End: 2025-06-20

## 2025-06-20 DIAGNOSIS — M25.562 PAIN IN BOTH KNEES, UNSPECIFIED CHRONICITY: Primary | ICD-10-CM

## 2025-06-20 DIAGNOSIS — M25.561 PAIN IN BOTH KNEES, UNSPECIFIED CHRONICITY: Primary | ICD-10-CM

## 2025-06-24 ENCOUNTER — HOSPITAL ENCOUNTER (OUTPATIENT)
Dept: GENERAL RADIOLOGY | Age: 35
Discharge: HOME | End: 2025-06-24
Attending: PHYSICIAN ASSISTANT
Payer: MEDICAID

## 2025-06-24 ENCOUNTER — OFFICE VISIT (OUTPATIENT)
Dept: ORTHOPEDICS CLINIC | Facility: CLINIC | Age: 35
End: 2025-06-24
Payer: MEDICAID

## 2025-06-24 VITALS — BODY MASS INDEX: 17.18 KG/M2 | WEIGHT: 120 LBS | HEIGHT: 70 IN

## 2025-06-24 DIAGNOSIS — M25.661 KNEE JOINT STIFFNESS, BILATERAL: ICD-10-CM

## 2025-06-24 DIAGNOSIS — M22.2X1 PATELLOFEMORAL PAIN SYNDROME OF BOTH KNEES: Primary | ICD-10-CM

## 2025-06-24 DIAGNOSIS — M25.562 PAIN IN BOTH KNEES, UNSPECIFIED CHRONICITY: ICD-10-CM

## 2025-06-24 DIAGNOSIS — M25.561 PAIN IN BOTH KNEES, UNSPECIFIED CHRONICITY: ICD-10-CM

## 2025-06-24 DIAGNOSIS — M25.662 KNEE JOINT STIFFNESS, BILATERAL: ICD-10-CM

## 2025-06-24 DIAGNOSIS — M22.2X2 PATELLOFEMORAL PAIN SYNDROME OF BOTH KNEES: Primary | ICD-10-CM

## 2025-06-24 PROCEDURE — 73564 X-RAY EXAM KNEE 4 OR MORE: CPT | Performed by: PHYSICIAN ASSISTANT

## 2025-06-24 PROCEDURE — 99213 OFFICE O/P EST LOW 20 MIN: CPT | Performed by: PHYSICIAN ASSISTANT

## 2025-06-24 NOTE — H&P
[facility] - ORTHOPEDICS  [address] [place] [id number]  [phone]     NEW PATIENT VISIT - HISTORY AND PHYSICAL EXAMINATION     Name: Phil Penny   MRN: [id number]  Date: 6/24/2025     CC: Bilateral knee pain.     REFERRED BY: PHYSICIAN NONSTAFF    HPI:   Phil Penny IV is a very pleasant 34 year old male who presents today for evaluation, consultation, and management of BILATERAL KNEE pain, right greater than left. He complains of swelling, weakness, instability. He has pain worse with knee bending and stairs. Of note, he has 40bs of unexplained weight loss. Pain is a 6/10.     Vape- no illicit drug use.     PMH:   Past Medical History[1]    PAST SURGICAL HX:  Past Surgical History[2]    FAMILY HX:  Family History[3]    ALLERGIES:  Patient has no known allergies.    MEDICATIONS: Current Medications[4]    ROS: A comprehensive 14 point review of systems was performed and was negative aside from the aforementioned per history of present illness.    SOCIAL HX:  Social History     Occupational History    Not on file   Tobacco Use    Smoking status: Former    Smokeless tobacco: Former   Vaping Use    Vaping status: Some Days    Substances: THC   Substance and Sexual Activity    Alcohol use: Never    Drug use: Not Currently     Types: Cannabis    Sexual activity: Not on file       PE:   Vitals:    06/24/25 1502   Weight: 120 lb (54.4 kg)   Height: 5' 10\" (1.778 m)     Estimated body mass index is 17.22 kg/m² as calculated from the following:    Height as of this encounter: 5' 10\" (1.778 m).    Weight as of this encounter: 120 lb (54.4 kg).    Physical Exam  Constitutional:       Appearance: Normal appearance.   HENT:      Head: Normocephalic and atraumatic.   Eyes:      Extraocular Movements: Extraocular movements intact.   Neck:      Musculoskeletal: Normal range of motion and neck supple.   Cardiovascular:      Pulses: Normal pulses.   Pulmonary:      Effort: Pulmonary effort is  normal. No respiratory distress.   Abdominal:      General: There is no distension.   Skin:     General: Skin is warm.      Capillary Refill: Capillary refill takes less than 2 seconds.      Findings: No bruising.   Neurological:      General: No focal deficit present.      Mental Status: Alert.   Psychiatric:         Mood and Affect: Mood normal.     Examination of the right and left knee demonstrates:     Skin is intact, warm and dry.   Atrophy: none    Effusion: none    Joint line tenderness: none  Crepitation: none   Kun: Negative   Patellar mobility: normal without apprehension  J-sign: none    ROM: Extension full  Flexion 120 degrees  ACL:  Negative Lachman, Negative Pivot Shift   PCL:  Negative Posterior Drawer  Collateral Ligaments: Stable to Varus and Valgus stress at 0 and 30 degrees  Strength: normal   Hip joint: normal pain-free ROM   Gait:  normal   Leg length: equal and symmetric  Alignment:  neutral     No obvious peripheral edema noted.   Distal neurovascular exam demonstrates normal perfusion, intact sensation to light touch and full strength.     Examination of the contralateral knee demonstrates:  No significant atrophy, swelling or effusion. Full range of motion. Neurovascularly intact distally.    Radiographic Examination/Diagnostics:  I personally viewed, independently interpreted and radiology report was reviewed.    X-ray, Right & Left Knee, 6/24/2025- no evidence of fracture, fb or soft tissue injury.     IMPRESSION: Phil Penny IV is a 34 year old male who presents with bilateral knee PFPS and unexplained weight loss.     PLAN:   We had a detailed discussion outlining the etiology, anatomy, pathophysiology, and natural history of the patient's findings. Imaging was reviewed in detail and correlated to a 3-dimensional model of the patient's pathology.     We reviewed the treatment of this disease condition.  Fortunately, treatment is amenable to conservative treatment which we chose to  optimize at today's visit.  We recommended physical therapy to aid in strengthening, range of motion, functional improvement, and return to baseline activity.      Regarding his unexplained weight loss, I recommend follow and establish care with primary care physician. The patient had the opportunity to ask questions and all questions were answered appropriately.                Maeganr JACOB Dorman, [facility], PA-C Orthopedic Surgery / Sports Medicine Specialist  [facility]  [address] [place] [id number]   [email or web address]  [email or web address]  t: [phone]  o: [phone]  f: [phone]    This note was dictated using Dragon software.  While it was briefly proofread prior to completion, some grammatical, spelling, and word choice errors due to dictation may still occur.         [1]   Past Medical History:   Anxiety    Anxiety state    Arthritis    Attention deficit hyperactivity disorder (ADHD)    Depression   [2]   Past Surgical History:  Procedure Laterality Date    Exc skin benig 2.1-3cm face,facial  10/08/2020    Repr cmpl wnd head,fac,hand 2.6-7.5  10/08/2020    Skin surgery  04/09/2025    Excision and closure left neck cyst   [3]   Family History  Problem Relation Age of Onset    Hypertension Mother     Cancer Maternal Grandfather     Cancer Paternal Grandfather    [4]   Current Outpatient Medications   Medication Sig Dispense Refill    sulfamethoxazole-trimethoprim -160 MG Oral Tab per tablet Take 1 tablet by mouth every 12 (twelve) hours. 14 tablet 0    methylPREDNISolone 4 MG Oral Tablet Therapy Pack Take by oral route. 21 tablet 0    clonazePAM 1 MG Oral Tab Take 2 tablets (2 mg total) by mouth as needed in the morning and 2 tablets (2 mg total) as needed in the evening for Anxiety.      oxyCODONE 10 MG Oral Tab 2 tablets (20 mg total).      oxyCODONE-acetaminophen  MG Oral Tab Take 1 tablet by mouth as needed in the morning and 1 tablet as needed in the evening.

## (undated) DEVICE — HEAD AND NECK: Brand: MEDLINE INDUSTRIES, INC.

## (undated) DEVICE — ENCORE® LATEX ACCLAIM SIZE 8, STERILE LATEX POWDER-FREE SURGICAL GLOVE: Brand: ENCORE

## (undated) DEVICE — SOL  .9 1000ML BTL

## (undated) DEVICE — UNDYED MONOFILAMENT (POLYDIOXANONE), ABSORBABLE SYNTHETIC SURGICAL SUTURE: Brand: PDS

## (undated) DEVICE — SUCTION CANISTER, 3000CC,SAFELINER: Brand: DEROYAL

## (undated) DEVICE — HEAD & NECK: Brand: MEDLINE INDUSTRIES, INC.

## (undated) DEVICE — GOWN,SIRUS,FABRNF,RAGLAN,L,ST,30/CS: Brand: MEDLINE

## (undated) DEVICE — SKIN PREP TRAY 4 COMPARTM TRAY: Brand: MEDLINE INDUSTRIES, INC.

## (undated) DEVICE — SUTURE PLAIN GUT 5-0 PC-1

## (undated) DEVICE — SUT PLN GUT 5-0 18IN PC-1 ABSRB TAN YELLOWISH

## (undated) DEVICE — TRAY SKIN PREP PVP-1

## (undated) DEVICE — GAMMEX® PI HYBRID SIZE 8, STERILE POWDER-FREE SURGICAL GLOVE, POLYISOPRENE AND NEOPRENE BLEND: Brand: GAMMEX

## (undated) DEVICE — SOLUTION IRRIG 1000ML 0.9% NACL USP BTL

## (undated) NOTE — LETTER
AUTHORIZATION FOR SURGICAL OPERATION OR OTHER PROCEDURE    1.  I hereby authorize Dr. Ema Watts  and [facility] staff assigned to my case to perform the following operation and/or procedure at the [facility]:    Correction of ingrown toena Date:  ______/______/_____                    Time:  ________ A. M.  P.M.        Patient Name:  ______________________________________________________  (please print)      Patient signature:  ___________________________________________________             Re